# Patient Record
Sex: FEMALE | Race: WHITE | NOT HISPANIC OR LATINO | Employment: OTHER | URBAN - NONMETROPOLITAN AREA
[De-identification: names, ages, dates, MRNs, and addresses within clinical notes are randomized per-mention and may not be internally consistent; named-entity substitution may affect disease eponyms.]

---

## 2017-03-27 ENCOUNTER — OFFICE VISIT (OUTPATIENT)
Dept: OTOLARYNGOLOGY | Facility: CLINIC | Age: 47
End: 2017-03-27

## 2017-03-27 VITALS
HEIGHT: 64 IN | BODY MASS INDEX: 42.34 KG/M2 | SYSTOLIC BLOOD PRESSURE: 133 MMHG | DIASTOLIC BLOOD PRESSURE: 92 MMHG | HEART RATE: 96 BPM | TEMPERATURE: 98.2 F | WEIGHT: 248 LBS

## 2017-03-27 DIAGNOSIS — J32.9 CHRONIC SINUSITIS, UNSPECIFIED: ICD-10-CM

## 2017-03-27 DIAGNOSIS — J31.0 CHRONIC RHINITIS: Primary | ICD-10-CM

## 2017-03-27 PROCEDURE — 99203 OFFICE O/P NEW LOW 30 MIN: CPT | Performed by: OTOLARYNGOLOGY

## 2017-03-27 RX ORDER — PRAMIPEXOLE DIHYDROCHLORIDE 0.75 MG/1
TABLET ORAL
Refills: 2 | COMMUNITY
Start: 2017-02-20

## 2017-03-27 RX ORDER — FLUTICASONE PROPIONATE 50 MCG
1 SPRAY, SUSPENSION (ML) NASAL
COMMUNITY

## 2017-03-27 RX ORDER — FERROUS SULFATE 325(65) MG
TABLET ORAL
Refills: 3 | COMMUNITY
Start: 2017-03-14

## 2017-03-27 RX ORDER — MEDROXYPROGESTERONE ACETATE 150 MG/ML
150 INJECTION, SUSPENSION INTRAMUSCULAR
COMMUNITY
Start: 2016-07-14

## 2017-03-27 RX ORDER — BUPROPION HYDROCHLORIDE 300 MG/1
TABLET ORAL
COMMUNITY
Start: 2016-04-13

## 2017-03-27 NOTE — PROGRESS NOTES
Patient Care Team:  Robi Maradiaga MD as PCP - General (Family Medicine)  Robi Maradiaga MD as Referring Physician (Family Medicine)  Mo Ruiz MD as Consulting Physician (Otolaryngology)    Chief Complaint   Patient presents with   • Sinus Problem     Recurrent/Repeated  Sinus Infection       Subjective   History of Present Illness:  Vidya Felipe is a  47 y.o.  female who complains of nasal congestion and sinus pressure. The symptoms are localized to the right nose. The patient has had moderate and variable symptoms. The symptoms have been intermittant for the last several years . The symptoms are aggravated by  wet weather . The symptoms are improved by sleeping under a fan and antibiotics.    Review of Systems:  Review of Systems   Constitutional: Negative for chills and fever.   HENT:        See HPI   Eyes: Negative.    Respiratory: Negative for cough and shortness of breath.    Cardiovascular: Negative.    Gastrointestinal: Negative for nausea and vomiting.   Endocrine: Negative.    Allergic/Immunologic: Negative.    Neurological: Negative for dizziness and headaches.   Hematological: Negative.    Psychiatric/Behavioral: Negative.        Past History:  Past Medical History:   Diagnosis Date   • Anemia    • Depression    • Kidney stone      Past Surgical History:   Procedure Laterality Date   • ABDOMINOPLASTY     • BILATERAL BREAST REDUCTION     • CHOLECYSTECTOMY     • COLON SURGERY     • OPTIC NERVE FENESTRATION Right    • TYMPANOPLASTY Right      History reviewed. No pertinent family history.  Social History   Substance Use Topics   • Smoking status: Never Smoker   • Smokeless tobacco: None   • Alcohol use Yes       Current Outpatient Prescriptions:   •  buPROPion XL (WELLBUTRIN XL) 300 MG 24 hr tablet, TAKE ONE TABLET BY MOUTH DAILY, Disp: , Rfl:   •  ferrous sulfate 325 (65 FE) MG tablet, TAKE 1 TABLET BY MOUTH TWICE A DAY, Disp: , Rfl: 3  •  fluticasone (FLONASE) 50 MCG/ACT  nasal spray, 1 spray into each nostril., Disp: , Rfl:   •  medroxyPROGESTERone (DEPO-PROVERA) 150 MG/ML injection, Inject 150 mg into the shoulder, thigh, or buttocks., Disp: , Rfl:   •  Omega-3 Fatty Acids (FISH OIL PO), Take  by mouth., Disp: , Rfl:   •  pramipexole (MIRAPEX) 0.75 MG tablet, TAKE 1 TABLET BY MOUTH EVERY DAY AT BEDTIME, Disp: , Rfl: 2  Allergies:  Sulfa antibiotics; Bactrim [sulfamethoxazole-trimethoprim]; and Oxycontin [oxycodone hcl]    Objective     Vital Signs:  Temp:  [98.2 °F (36.8 °C)] 98.2 °F (36.8 °C)  Heart Rate:  [96] 96  BP: (133)/(92) 133/92    Physical Exam:  CONSTITUTIONAL: well nourished, well-developed, alert, oriented, in no acute distress   COMMUNICATION AND VOICE: able to communicate normally, normal voice quality  HEAD: normocephalic, no lesions, atraumatic, no tenderness, no masses   FACE: appearance normal, no lesions, no tenderness, no deformities, facial motion symmetric  SALIVARY GLANDS: parotid glands with no tenderness, no swelling, no masses, submandibular glands with normal size, nontender  EYES: ocular motility normal, eyelids normal, orbits normal, no proptosis, conjunctiva normal , pupils equal, round   EARS:  Hearing: response to conversational voice normal bilaterally   External Ears: auricles without lesions  Otoscopic: tympanic membrane appearance normal, no lesions, no perforation, normal mobility, no fluid  NOSE:  External Nose: structure normal, no tenderness on palpation, no nasal discharge, no lesions, no evidence of trauma, nostrils patent   Intranasal Exam: dry nasal mucosa with erythema, no polyps, no overt septal deviation  ORAL:  Lips: upper and lower lips without lesion   Teeth: dentition within normal limits for age   Gums: gingivae healthy   Oral Mucosa: oral mucosa normal, no mucosal lesions   Floor of Mouth: Warthin’s duct patent, mucosa normal  Tongue: lingual mucosa normal without lesions, normal tongue mobility   Palate: soft and hard palates  with normal mucosa and structure  Oropharynx: oropharyngeal mucosa normal, tonsils normal in appearance   NECK: neck appearance normal, no masses or tenderness  THYROID: no overt thyromegaly, no tenderness, nodules or mass present on palpation, position midline   LYMPH NODES: no lymphadenopathy  CHEST/RESPIRATORY: respiratory effort normal, normal breath sounds   CARDIOVASCULAR: rate and rhythm normal, extremities without cyanosis or edema    NEUROLOGIC/PSYCHIATRIC: oriented to time, place and person, mood normal, affect appropriate, CN II-XII intact grossly    Results Review:   Ct scan of the sinuses reviewed: minimal polypoid change in the bilateral maxillary sinuses        Assessment   1. Chronic rhinitis    2. Chronic sinusitis, unspecified          Plan   Medical and surgical options were discussed. Risks, benefits and alternatives were discussed and questions were answered. After considering the options, we will do allergy testing first and consider FESS if negative or no improvment    -------MEDICATIONS:-------  continue medication as previously prescribed  fluticisone 2 puff qd     -------TESTING--------  intradermal allergy testing     -------ALLERGY-------  For the best response, use your nasal sprays every day without skipping doses. It may take several weeks before the full effect is acheived.     Follow up in 6 weeks    Mo Ruiz MD  03/27/17  11:03 AM

## 2017-04-04 ENCOUNTER — PROCEDURE VISIT (OUTPATIENT)
Dept: OTOLARYNGOLOGY | Facility: CLINIC | Age: 47
End: 2017-04-04

## 2017-04-04 DIAGNOSIS — J30.89 OTHER ALLERGIC RHINITIS: Primary | ICD-10-CM

## 2017-04-04 PROCEDURE — 95004 PERQ TESTS W/ALRGNC XTRCS: CPT | Performed by: OTOLARYNGOLOGY

## 2017-04-04 PROCEDURE — 95024 IQ TESTS W/ALLERGENIC XTRCS: CPT | Performed by: OTOLARYNGOLOGY

## 2017-04-04 NOTE — PROGRESS NOTES
Allergy History Questionnaire  Vidya Felipe 1970 4/4/2017  Occupation:     Symptoms  (Check which applies)  Severity? Frequency? When are they worse?   [] Mild [] Constant [] Spring   [] Moderate [x] Erratic [] Summer   [] Severe [] Occasional [] Fall   [x] Variable [] Seasonal [] Winter     [x] Year Round       Do they interfere with your life? Do they interfere with your sleep?   [] Note at all [] Yes   [] A little [] No   [x] Moderately [] If yes, please explain: ___________________________________   [] Prevents normal activity        Please check the symptoms that apply to your allergy symptoms.  Nose Eyes Ears   [] Bleeding [x] Infections [] Buzzing   [x] Crusting [x] Itching [] Dizziness   [x] Dryness [] Redness [x] Drainage   [x] Itching [x] Swollen Lids [x] Fullness   [x] Nasal Congestion [x] Watery [] Hearing Loss   [x] Nasal Drainage [] None [x] Itching   [x] Nasal Polyps  [] Pain   [] Septal Deviation  [x] Pressure   [x] Sneezing     [] None         Throat Mouth Chest   [] Burning [] Burning [] Asthma as a child   [] Dryness [] Dryness [] Asthma as an adult   [x] Hoarseness [] Itching [] Bronchitis   [] Laryngitis [] Mouth Breathing [] Cough   [x] Snoring [x] None [] Pneumonia   [x] Sore Throats  [] Wheezing   [] Tonsillectomy  [x] None   [] Vocal Cord Polyps     [] None           Skin   [] Dryness   [] Eczema   [x] Hives   [] Itching   [] Rash   [] Swelling   [] None     Other Irritants: none    Environment    Inside Home? Smoking Habits?   [x] Carpeting [] Cigarettes   [] Hardwood [] Cigars   [] Fireplace (Gas or Wood Burning) [] Pipe   [x] Laminate Floor [] Years Smoked   [] Plants [] Stopped Smoking    [] Tile [x] Exposed to Secondhand Smoke     Animals in the home? Near your home?   [] Bird(s) [] Barn   [x] Cat(s) indoor long hair [] Factory   [] Dog(s) [x] Fields   [] Fish [x] Trees   [] Other [x] Weeds    [x] Water (creek, lake, pond, river)     Heating your home?  Cooling your home?   [x] Electric [x] Central Air Unit   [] Natural Gas [x] Fan(s)   [] Oil [] Window Unit   [] Propane    [] Wood    [] Josh/Thermal      Any known allergic reactions to any of the following?   [] Bees   [] Mosquitoes   [x] Wasps                 Have you ever been allergy tested in the past?  No    Have you ever had to seek medical treatment in an Emergency Room due to an allergic reaction?  No

## 2017-05-07 PROBLEM — J30.9 ALLERGIC RHINITIS, UNSPECIFIED: Status: ACTIVE | Noted: 2017-05-07

## 2017-05-08 ENCOUNTER — OFFICE VISIT (OUTPATIENT)
Dept: OTOLARYNGOLOGY | Facility: CLINIC | Age: 47
End: 2017-05-08

## 2017-05-08 VITALS
TEMPERATURE: 99.1 F | BODY MASS INDEX: 42.17 KG/M2 | SYSTOLIC BLOOD PRESSURE: 150 MMHG | DIASTOLIC BLOOD PRESSURE: 93 MMHG | HEART RATE: 108 BPM | WEIGHT: 247 LBS | HEIGHT: 64 IN

## 2017-05-08 DIAGNOSIS — J31.0 CHRONIC RHINITIS: ICD-10-CM

## 2017-05-08 DIAGNOSIS — J32.9 CHRONIC SINUSITIS, UNSPECIFIED: ICD-10-CM

## 2017-05-08 DIAGNOSIS — J30.9 ALLERGIC RHINITIS, UNSPECIFIED: Primary | ICD-10-CM

## 2017-05-08 PROCEDURE — 99214 OFFICE O/P EST MOD 30 MIN: CPT | Performed by: OTOLARYNGOLOGY

## 2017-05-08 RX ORDER — AZELASTINE 1 MG/ML
2 SPRAY, METERED NASAL 2 TIMES DAILY
Qty: 30 ML | Refills: 6 | Status: SHIPPED | OUTPATIENT
Start: 2017-05-08 | End: 2017-06-07

## 2017-05-08 RX ORDER — EPINEPHRINE 0.3 MG/.3ML
0.3 INJECTION SUBCUTANEOUS AS NEEDED
Qty: 1 EACH | Refills: 3 | Status: SHIPPED | OUTPATIENT
Start: 2017-05-08

## 2017-05-19 ENCOUNTER — CLINICAL SUPPORT NO REQUIREMENTS (OUTPATIENT)
Dept: OTOLARYNGOLOGY | Facility: CLINIC | Age: 47
End: 2017-05-19

## 2017-05-19 DIAGNOSIS — J30.89 OTHER ALLERGIC RHINITIS: Primary | ICD-10-CM

## 2017-05-22 ENCOUNTER — CLINICAL SUPPORT (OUTPATIENT)
Dept: OTOLARYNGOLOGY | Facility: CLINIC | Age: 47
End: 2017-05-22

## 2017-05-22 DIAGNOSIS — J30.9 ALLERGIC RHINITIS, UNSPECIFIED: ICD-10-CM
